# Patient Record
Sex: FEMALE
[De-identification: names, ages, dates, MRNs, and addresses within clinical notes are randomized per-mention and may not be internally consistent; named-entity substitution may affect disease eponyms.]

---

## 2021-12-29 PROBLEM — Z00.00 ENCOUNTER FOR PREVENTIVE HEALTH EXAMINATION: Status: ACTIVE | Noted: 2021-12-29

## 2022-01-04 ENCOUNTER — RESULT CHARGE (OUTPATIENT)
Age: 62
End: 2022-01-04

## 2022-01-04 ENCOUNTER — APPOINTMENT (OUTPATIENT)
Dept: UROGYNECOLOGY | Facility: CLINIC | Age: 62
End: 2022-01-04
Payer: COMMERCIAL

## 2022-01-04 DIAGNOSIS — R33.9 RETENTION OF URINE, UNSPECIFIED: ICD-10-CM

## 2022-01-04 DIAGNOSIS — Z93.3 COLOSTOMY STATUS: ICD-10-CM

## 2022-01-04 DIAGNOSIS — Z78.9 OTHER SPECIFIED HEALTH STATUS: ICD-10-CM

## 2022-01-04 DIAGNOSIS — Z86.39 PERSONAL HISTORY OF OTHER ENDOCRINE, NUTRITIONAL AND METABOLIC DISEASE: ICD-10-CM

## 2022-01-04 DIAGNOSIS — C19 MALIGNANT NEOPLASM OF RECTOSIGMOID JUNCTION: ICD-10-CM

## 2022-01-04 DIAGNOSIS — R35.0 FREQUENCY OF MICTURITION: ICD-10-CM

## 2022-01-04 DIAGNOSIS — N39.3 STRESS INCONTINENCE (FEMALE) (MALE): ICD-10-CM

## 2022-01-04 DIAGNOSIS — R32 UNSPECIFIED URINARY INCONTINENCE: ICD-10-CM

## 2022-01-04 DIAGNOSIS — Z87.891 PERSONAL HISTORY OF NICOTINE DEPENDENCE: ICD-10-CM

## 2022-01-04 DIAGNOSIS — Z82.3 FAMILY HISTORY OF STROKE: ICD-10-CM

## 2022-01-04 DIAGNOSIS — R39.13 SPLITTING OF URINARY STREAM: ICD-10-CM

## 2022-01-04 DIAGNOSIS — N36.42 INTRINSIC SPHINCTER DEFICIENCY (ISD): ICD-10-CM

## 2022-01-04 DIAGNOSIS — R35.1 NOCTURIA: ICD-10-CM

## 2022-01-04 LAB
BILIRUB UR QL STRIP: NEGATIVE
CLARITY UR: CLEAR
COLLECTION METHOD: NORMAL
GLUCOSE UR-MCNC: NEGATIVE
HCG UR QL: 0.2 EU/DL
HGB UR QL STRIP.AUTO: NORMAL
KETONES UR-MCNC: NEGATIVE
LEUKOCYTE ESTERASE UR QL STRIP: NORMAL
NITRITE UR QL STRIP: NEGATIVE
PH UR STRIP: 5
PROT UR STRIP-MCNC: NEGATIVE
SP GR UR STRIP: 1.02

## 2022-01-04 PROCEDURE — 99205 OFFICE O/P NEW HI 60 MIN: CPT | Mod: 25

## 2022-01-04 PROCEDURE — 51701 INSERT BLADDER CATHETER: CPT

## 2022-01-04 RX ORDER — LEVOTHYROXINE SODIUM 0.17 MG/1
TABLET ORAL
Refills: 0 | Status: ACTIVE | COMMUNITY

## 2022-01-04 RX ORDER — DULOXETINE HYDROCHLORIDE 30 MG/1
CAPSULE, DELAYED RELEASE ORAL
Refills: 0 | Status: ACTIVE | COMMUNITY

## 2022-01-04 RX ORDER — VALACYCLOVIR HYDROCHLORIDE 1 G/1
TABLET, FILM COATED ORAL
Refills: 0 | Status: ACTIVE | COMMUNITY

## 2022-01-04 NOTE — PHYSICAL EXAM
[Chaperone Present] : A chaperone was present in the examining room during all aspects of the physical examination [No Acute Distress] : in no acute distress [Oriented x3] : oriented to person, place, and time [No Edema] : ~T edema was not present [Symmetrical] : the neck was ~L symmetrical [None] : no CVA tenderness [Warm and Dry] : was warm and dry to touch [Normal Gait] : gait was normal [Labia Majora] : were normal [Labia Minora] : were normal [Bartholin's Gland] : both Bartholin's glands were normal  [Atrophy] : atrophy [No Bleeding] : there was no active vaginal bleeding [2] : 2 [Aa ____] : Aa [unfilled] [Ba ____] : Ba [unfilled] [C ____] : C [unfilled] [GH ____] : GH [unfilled] [PB ____] : PB [unfilled] [TVL ____] : TVL  [unfilled] [Ap ____] : Ap [unfilled] [Bp ____] : Bp [unfilled] [] : I [Absent] : absent [Normal] : normal [Soft] :  the cervix was soft [Uterine Adnexae] : were not tender and not enlarged [Post Void Residual ____ml] : post void residual was [unfilled] ml [Exam Deferred] : was deferred [Cough] : no cough [Tenderness] : ~T no ~M abdominal tenderness observed [Distended] : not distended [Inguinal LAD] : no adenopathy was noted in the inguinal lymph nodes [FreeTextEntry3] : supine cst+, no urethral hypermobility appreciable [FreeTextEntry4] : cuff intact, left size of apex < 1 cm dimpling palpable FB digitally c/w permanent suture? no dischage, no blood, no collection, no gross fistula [de-identified] : anterior wall "fullness" without protrusion [de-identified] : no apprec mass, difficult for patient to tolerate

## 2022-01-04 NOTE — ASSESSMENT
[FreeTextEntry1] : Evelyne is a pleasant 62 yo P1 with hx C/S x 1, SOFIE/BSO/colon rection, RT/chemo for CRS cancer currently on chemo cycle 4, hx hematoma drainage, ileostomy and reversal, hx RV fistula s/p repair, currently with colostomy, with lung mets, presenting with bothersome leakage of urine and sexual dysfunction. On exam, she did not have signif POP. Her PVR was 400 ml and the urine was sent for analysis and culture. She had a positive CST without noteable urethral hypermobility, c/w ISD visually. We discussed her prior surgeroes, comorbidities, hx of RT to the pelvis and implications. 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 We discussed testing - I will try to obtain op reports and any testing from her Urologist at  Dr. Samuel Matias - cysto, UDS testing, imaging with CT C/A/P most recently to eval detrusor function, for ATA, and to eval for urinary tract fistula. We discussed limitations of many UI treatmetns due to the retention. We discussed SNS to treat UI as well as retention with onc clearance. We discussed periurethral bulking in the office specifically, and while there is a risk at worsening retention --> hydronephrosis --> infections and renal damage, for quality of life, I could consider it if she understands the risk. She appreciated the consultation and will RTO prn. All ques answered.

## 2022-01-04 NOTE — HISTORY OF PRESENT ILLNESS
[FreeTextEntry1] : Freq of urination q hour with insensible loss. No dysuria or painful urination. +Incomplete emptying subjectively and with dribbling, slow flow. No gross hematuria or UTIs. +leakage with cough sneeze. +Palpable bulge at introitus and when she pushes it up, notices leakage of urine. No bowel movements per rectum, has colostomy bag. 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 2019 SOFIE is when symptoms started. After SOFIE had intermittent retention with intermittent indwelling Grossman until early 2021. Urinary issues affecting sexual function is most troublesome to her. \par \par PMH stage 4 CRS cancer, hypothyroidism\par PSH C/S x 1, 2019 exlap SOFIE/BSO + colon resection, ileoscomy with reversal, colostomy, stoma repair, RV fistula repair, IR-guided drainage of hematoma/collection\par POB C/S x 1

## 2022-01-04 NOTE — PROCEDURE
[Straight Catheterization] : insertion of a straight catheter [Urinary Retention] : urinary retention [Stress Incontinence] : stress incontinence [Urgent Incontinence] : urgent incontinence [Urinary Frequency] : urinary frequency [Patient] : the patient [___ Fr Straight Tip] : a [unfilled] in [place] straight tip catheter [None] : there were no complications with the catheter insertion [Clear] : clear [Culture] : culture [Urinalysis] : urinalysis [No Complications] : no complications [Tolerated Well] : the patient tolerated the procedure well [Post procedure instructions and information given] : Post procedure instructions and information were given and reviewed with patient. [1] : 1 [FreeTextEntry1] : cathed to obtain pvr and uncontam specimen

## 2022-01-04 NOTE — REASON FOR VISIT
[Questionnaire Received] : Patient questionnaire received [Urinary Incontinence] : urinary incontinence [Urine Frequency] : urine frequency [Poor/Slow Urine Flow] : poor/slow urine flow [Cannot Empty Bladder] : cannot empty bladder

## 2022-01-05 LAB
APPEARANCE: CLEAR
BACTERIA: NEGATIVE
BILIRUBIN URINE: NEGATIVE
BLOOD URINE: NEGATIVE
COLOR: NORMAL
GLUCOSE QUALITATIVE U: NEGATIVE
HYALINE CASTS: 0 /LPF
KETONES URINE: NEGATIVE
LEUKOCYTE ESTERASE URINE: ABNORMAL
MICROSCOPIC-UA: NORMAL
NITRITE URINE: NEGATIVE
PH URINE: 5.5
PROTEIN URINE: NEGATIVE
RED BLOOD CELLS URINE: 2 /HPF
SPECIFIC GRAVITY URINE: 1.01
SQUAMOUS EPITHELIAL CELLS: 0 /HPF
UROBILINOGEN URINE: NORMAL
WHITE BLOOD CELLS URINE: 35 /HPF

## 2022-01-06 LAB — BACTERIA UR CULT: NORMAL

## 2022-11-09 ENCOUNTER — OFFICE (OUTPATIENT)
Dept: URBAN - METROPOLITAN AREA CLINIC 1 | Facility: CLINIC | Age: 62
Setting detail: OPHTHALMOLOGY
End: 2022-11-09
Payer: COMMERCIAL

## 2022-11-09 DIAGNOSIS — B00.52: ICD-10-CM

## 2022-11-09 DIAGNOSIS — H16.223: ICD-10-CM

## 2022-11-09 DIAGNOSIS — H20.011: ICD-10-CM

## 2022-11-09 DIAGNOSIS — H40.013: ICD-10-CM

## 2022-11-09 DIAGNOSIS — H25.13: ICD-10-CM

## 2022-11-09 PROCEDURE — 99213 OFFICE O/P EST LOW 20 MIN: CPT | Performed by: OPHTHALMOLOGY

## 2022-11-09 PROCEDURE — 92083 EXTENDED VISUAL FIELD XM: CPT | Performed by: OPHTHALMOLOGY

## 2022-11-09 PROCEDURE — 92133 CPTRZD OPH DX IMG PST SGM ON: CPT | Performed by: OPHTHALMOLOGY

## 2022-11-09 ASSESSMENT — REFRACTION_AUTOREFRACTION
OS_SPHERE: +0.50
OD_SPHERE: ERR
OS_CYLINDER: SPH
OS_AXIS: 0

## 2022-11-09 ASSESSMENT — PACHYMETRY
OD_CT_UM: 552
OD_CT_CORRECTION: -1
OS_CT_UM: 556
OS_CT_CORRECTION: -1

## 2022-11-09 ASSESSMENT — CONFRONTATIONAL VISUAL FIELD TEST (CVF)
OD_FINDINGS: FULL
OS_FINDINGS: FULL

## 2022-11-09 ASSESSMENT — KERATOMETRY
OS_K2POWER_DIOPTERS: 44.25
OD_AXISANGLE_DEGREES: 054
METHOD_AUTO_MANUAL: AUTO
OS_AXISANGLE_DEGREES: 107
OD_K2POWER_DIOPTERS: 49.00
OD_K1POWER_DIOPTERS: 44.75
OS_K1POWER_DIOPTERS: 43.50

## 2022-11-09 ASSESSMENT — VISUAL ACUITY
OS_BCVA: 20/50
OD_BCVA: 20/20

## 2022-11-09 ASSESSMENT — AXIALLENGTH_DERIVED
OD_AL: 22.9544
OS_AL: 23.3114

## 2022-11-09 ASSESSMENT — REFRACTION_MANIFEST
OS_CYLINDER: -0.25
OS_VA1: 20/20
OD_SPHERE: +1.50
OS_SPHERE: +0.50
OD_AXIS: 140
OD_VA1: 20/30-2
OS_AXIS: 85
OD_CYLINDER: -6.00

## 2022-11-09 ASSESSMENT — SUPERFICIAL PUNCTATE KERATITIS (SPK)
OS_SPK: T
OD_SPK: T

## 2022-11-09 ASSESSMENT — TONOMETRY
OS_IOP_MMHG: 17
OD_IOP_MMHG: 14

## 2022-11-09 ASSESSMENT — SPHEQUIV_DERIVED
OD_SPHEQUIV: -1.5
OS_SPHEQUIV: 0.375

## 2023-06-14 ENCOUNTER — OFFICE (OUTPATIENT)
Dept: URBAN - METROPOLITAN AREA CLINIC 1 | Facility: CLINIC | Age: 63
Setting detail: OPHTHALMOLOGY
End: 2023-06-14
Payer: COMMERCIAL

## 2023-06-14 DIAGNOSIS — H17.9: ICD-10-CM

## 2023-06-14 DIAGNOSIS — H16.223: ICD-10-CM

## 2023-06-14 DIAGNOSIS — H52.4: ICD-10-CM

## 2023-06-14 DIAGNOSIS — B00.52: ICD-10-CM

## 2023-06-14 DIAGNOSIS — H20.011: ICD-10-CM

## 2023-06-14 DIAGNOSIS — H40.013: ICD-10-CM

## 2023-06-14 DIAGNOSIS — H25.13: ICD-10-CM

## 2023-06-14 PROCEDURE — 92250 FUNDUS PHOTOGRAPHY W/I&R: CPT | Performed by: OPHTHALMOLOGY

## 2023-06-14 PROCEDURE — 92014 COMPRE OPH EXAM EST PT 1/>: CPT | Performed by: OPHTHALMOLOGY

## 2023-06-14 PROCEDURE — 92025 CPTRIZED CORNEAL TOPOGRAPHY: CPT | Performed by: OPHTHALMOLOGY

## 2023-06-14 PROCEDURE — 92285 EXTERNAL OCULAR PHOTOGRAPHY: CPT | Performed by: OPHTHALMOLOGY

## 2023-06-14 PROCEDURE — 92015 DETERMINE REFRACTIVE STATE: CPT | Performed by: OPHTHALMOLOGY

## 2023-06-14 ASSESSMENT — KERATOMETRY
OS_K1POWER_DIOPTERS: 43.50
OS_K2POWER_DIOPTERS: 44.25
OS_AXISANGLE_DEGREES: 106
OD_AXISANGLE_DEGREES: 050
METHOD_AUTO_MANUAL: AUTO
OD_K2POWER_DIOPTERS: 49.25
OD_K1POWER_DIOPTERS: 43.25

## 2023-06-14 ASSESSMENT — REFRACTION_AUTOREFRACTION
OD_AXIS: 130
OS_AXIS: 087
OD_SPHERE: +1.50
OS_SPHERE: +1.50
OD_CYLINDER: -3.25
OS_CYLINDER: -0.50

## 2023-06-14 ASSESSMENT — REFRACTION_MANIFEST
OD_VA1: 20/20-2
OD_SPHERE: +0.25
OD_AXIS: 130
OS_AXIS: 090
OS_SPHERE: +1.50
OS_ADD: +2.50
OS_CYLINDER: -0.50
OS_VA1: 20/20-
OD_ADD: +2.50
OU_VA: 20/20-1
OD_CYLINDER: -0.75

## 2023-06-14 ASSESSMENT — VISUAL ACUITY
OS_BCVA: 20/30-2
OD_BCVA: 20/30+3

## 2023-06-14 ASSESSMENT — SPHEQUIV_DERIVED
OS_SPHEQUIV: 1.25
OD_SPHEQUIV: -0.125
OD_SPHEQUIV: -0.125
OS_SPHEQUIV: 1.25

## 2023-06-14 ASSESSMENT — CONFRONTATIONAL VISUAL FIELD TEST (CVF)
OS_FINDINGS: FULL
OD_FINDINGS: FULL

## 2023-06-14 ASSESSMENT — PACHYMETRY
OD_CT_UM: 552
OS_CT_UM: 556
OD_CT_CORRECTION: -1
OS_CT_CORRECTION: -1

## 2023-06-14 ASSESSMENT — AXIALLENGTH_DERIVED
OD_AL: 22.6682
OS_AL: 22.9833
OS_AL: 22.9833
OD_AL: 22.6682

## 2023-06-14 ASSESSMENT — SUPERFICIAL PUNCTATE KERATITIS (SPK)
OS_SPK: T
OD_SPK: T

## 2023-06-14 ASSESSMENT — TONOMETRY
OS_IOP_MMHG: 14
OD_IOP_MMHG: 12

## 2023-06-26 ENCOUNTER — OFFICE (OUTPATIENT)
Dept: URBAN - METROPOLITAN AREA CLINIC 1 | Facility: CLINIC | Age: 63
Setting detail: OPHTHALMOLOGY
End: 2023-06-26
Payer: COMMERCIAL

## 2023-06-26 DIAGNOSIS — H20.011: ICD-10-CM

## 2023-06-26 DIAGNOSIS — H17.9: ICD-10-CM

## 2023-06-26 DIAGNOSIS — H16.223: ICD-10-CM

## 2023-06-26 DIAGNOSIS — B00.52: ICD-10-CM

## 2023-06-26 DIAGNOSIS — H25.13: ICD-10-CM

## 2023-06-26 PROCEDURE — 99213 OFFICE O/P EST LOW 20 MIN: CPT | Performed by: OPHTHALMOLOGY

## 2023-06-26 ASSESSMENT — SPHEQUIV_DERIVED
OS_SPHEQUIV: 1
OD_SPHEQUIV: -0.125
OD_SPHEQUIV: -0.125
OS_SPHEQUIV: 1.25

## 2023-06-26 ASSESSMENT — KERATOMETRY
OS_K1POWER_DIOPTERS: 43.50
OD_AXISANGLE_DEGREES: 43
OD_K1POWER_DIOPTERS: 43.50
OD_K2POWER_DIOPTERS: 52.75
OS_AXISANGLE_DEGREES: 97
METHOD_AUTO_MANUAL: AUTO
OS_K2POWER_DIOPTERS: 44.25

## 2023-06-26 ASSESSMENT — REFRACTION_MANIFEST
OS_VA1: 20/20-
OD_CYLINDER: -0.75
OS_AXIS: 090
OU_VA: 20/20-1
OD_SPHERE: +0.25
OS_ADD: +2.50
OD_VA1: 20/20-2
OS_CYLINDER: -0.50
OD_AXIS: 130
OD_ADD: +2.50
OS_SPHERE: +1.50

## 2023-06-26 ASSESSMENT — SUPERFICIAL PUNCTATE KERATITIS (SPK)
OD_SPK: T
OS_SPK: T

## 2023-06-26 ASSESSMENT — AXIALLENGTH_DERIVED
OS_AL: 22.9833
OS_AL: 23.0761
OD_AL: 22.0497
OD_AL: 22.0497

## 2023-06-26 ASSESSMENT — REFRACTION_AUTOREFRACTION
OD_SPHERE: +1.50
OS_SPHERE: +1.25
OS_CYLINDER: -0.50
OS_AXIS: 101
OD_AXIS: 130
OD_CYLINDER: -3.25

## 2023-06-26 ASSESSMENT — PACHYMETRY
OS_CT_UM: 556
OD_CT_CORRECTION: -1
OD_CT_UM: 552
OS_CT_CORRECTION: -1

## 2023-06-26 ASSESSMENT — VISUAL ACUITY
OD_BCVA: 20/30-1
OS_BCVA: 20/50+1

## 2023-06-26 ASSESSMENT — TONOMETRY
OS_IOP_MMHG: 19
OD_IOP_MMHG: 12

## 2023-06-26 ASSESSMENT — CONFRONTATIONAL VISUAL FIELD TEST (CVF)
OD_FINDINGS: FULL
OS_FINDINGS: FULL

## 2023-07-27 ENCOUNTER — OFFICE (OUTPATIENT)
Dept: URBAN - METROPOLITAN AREA CLINIC 1 | Facility: CLINIC | Age: 63
Setting detail: OPHTHALMOLOGY
End: 2023-07-27
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
## 2023-07-27 PROCEDURE — NO SHOW FE NO SHOW FEE: Performed by: OPHTHALMOLOGY

## 2024-06-10 ENCOUNTER — OFFICE (OUTPATIENT)
Dept: URBAN - METROPOLITAN AREA CLINIC 1 | Facility: CLINIC | Age: 64
Setting detail: OPHTHALMOLOGY
End: 2024-06-10
Payer: COMMERCIAL

## 2024-06-10 DIAGNOSIS — H17.9: ICD-10-CM

## 2024-06-10 DIAGNOSIS — H25.13: ICD-10-CM

## 2024-06-10 DIAGNOSIS — H20.011: ICD-10-CM

## 2024-06-10 DIAGNOSIS — B00.52: ICD-10-CM

## 2024-06-10 DIAGNOSIS — H40.013: ICD-10-CM

## 2024-06-10 DIAGNOSIS — H16.223: ICD-10-CM

## 2024-06-10 PROCEDURE — 92083 EXTENDED VISUAL FIELD XM: CPT | Performed by: OPHTHALMOLOGY

## 2024-06-10 PROCEDURE — 92250 FUNDUS PHOTOGRAPHY W/I&R: CPT | Performed by: OPHTHALMOLOGY

## 2024-06-10 PROCEDURE — 92014 COMPRE OPH EXAM EST PT 1/>: CPT | Performed by: OPHTHALMOLOGY

## 2024-06-10 PROCEDURE — 92025 CPTRIZED CORNEAL TOPOGRAPHY: CPT | Performed by: OPHTHALMOLOGY

## 2024-06-10 ASSESSMENT — CONFRONTATIONAL VISUAL FIELD TEST (CVF)
OS_FINDINGS: FULL
OD_FINDINGS: FULL